# Patient Record
Sex: MALE | Race: WHITE
[De-identification: names, ages, dates, MRNs, and addresses within clinical notes are randomized per-mention and may not be internally consistent; named-entity substitution may affect disease eponyms.]

---

## 2018-01-05 ENCOUNTER — HOSPITAL ENCOUNTER (EMERGENCY)
Dept: HOSPITAL 80 - FED | Age: 33
Discharge: HOME | End: 2018-01-05
Payer: SELF-PAY

## 2018-01-05 VITALS
TEMPERATURE: 97.8 F | RESPIRATION RATE: 19 BRPM | OXYGEN SATURATION: 93 % | SYSTOLIC BLOOD PRESSURE: 112 MMHG | DIASTOLIC BLOOD PRESSURE: 76 MMHG | HEART RATE: 89 BPM

## 2018-01-05 DIAGNOSIS — I30.9: Primary | ICD-10-CM

## 2018-01-05 LAB — PLATELET # BLD: 441 10^3/UL (ref 150–400)
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
## 2018-01-05 NOTE — CPEKG
Heart Rate: 108

RR Interval: 556

P-R Interval: 184

QRSD Interval: 98

QT Interval: 316

QTC Interval: 424

P Axis: 76

QRS Axis: 79

T Wave Axis: 13

EKG Severity - ABNORMAL ECG -

EKG Impression: SINUS TACHYCARDIA

EKG Impression: AR depression in the inferior leads, consider pericarditis

Electronically Signed By: Humaira Childers 05-Jan-2018 15:16:12

## 2018-01-05 NOTE — GCON
[f rep st]



                                                                    CONSULTATION





The patient is a healthy gentleman who developed chest discomfort. 



He has been very well and healthy.  He has been exercising, feeling good and then, about 2-1/2 weeks 
ago he got a little bit of discomfort between his shoulder blades.  He developed, today, severe left 
chest discomfort, shortness of breath and very, very bad pain.  He feels cold. 



He has not felt hot.  He has not had fever, chills.  He has not had upper respiratory tract symptoms.
  He has had no rashes or arthralgias. 



He has had no nausea, vomiting. 



No lightheadedness, dizziness.  No fatigue.  No trauma at all. 



He is a healthy man.  Has not had heart issues. 



He has a remote history of migraines. 



Those have not been bothering him.



CARDIAC RISK FACTORS:  Negative for hypertension, diabetes mellitus, hyperlipidemia, hyperuricemia, f
amily history of premature coronary disease, smoking, or any known coronary artery disease.  He does 
not have obesity.



REVIEW OF SYSTEMS:  Negative except as noted above.  10-point review of systems.



MEDICATIONS:  None.



SURGICAL HISTORY:  Status post splenectomy.



SOCIAL HISTORY:  He was born in [place], [place], went to school here.  He is a student now of busin
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  He work
ed hard and there was a lot of labor involved, many strange hours.  He has a roommate who is helpful.
  He exercises by yoga and swimming on a regular basis.  He has been swimming for 10-15 years and lov
es it as an exercise. 



Does not smoke, does not drink significant amounts of alcohol, and does not use any other drugs.



FAMILY HISTORY:  No family history of premature coronary artery disease.  No history of unexplained s
udden death at a young age.



PHYSICAL EXAMINATION:  VITAL SIGNS:  His heart rate is 90, respiratory rate is 12, his blood pressure
 is 130/70.  HEENT:  Pupils equal and reactive.  Mucous membranes and mouth moist.  NECK:  Supple.  C
ARDIOVASCULAR:  S1, S2.  Soft systolic murmur left sternal border.  No diastolic murmur.  No S3, S4. 
 No rubs.  PULMONARY:  Rhonchi.  No rales, wheezing, or dullness.  ABDOMEN:  Soft, nontender, without
 masses.  CVA:  No tenderness.  EXTREMITIES:  No edema, inflammation or ulceration.  SKIN:  Age-relat
ed changes.  PSYCH:  No obvious anxiety or depression. 



The EKG shows sinus tachycardia with MI depression in II, III, and aVF, consistent with pericarditis.
 



Chest x-ray is unremarkable.  The patient has had dissection ruled out by a CT scan.  No pulmonary em
bolic disease.  Question of pericarditis.  Echocardiogram shows that the patient has a pericardial ef
fusion without any tamponade physiology. 



White count is 21.63, hematocrit 45, platelets are 441.  Sodium 143, potassium 4.8, chloride 105, CO2
 19, BUN 11, creatinine 0.8.  Troponin less than 0.02.  NT-BNP 34.  Glucose 93.



ASSESSMENT AND PLAN:  Chest pain. 



The patient has a clinical picture which is consistent with acute pericarditis. 



I think that that is his current problem.  There is nothing to suggest an acute coronary syndrome in 
his case.  His pulses are full and equal and the CT angiogram did not show anything consistent with a
n aortic dissection.  His D-dimer is negative.  And the CT scan shows no pulmonary embolic disease in
 terms of the differential diagnosis.  So far, there is nothing to suggest pneumonia, but that is cer
tainly a possibility in his case that may be just brewing and with hydration might show up.  Tomy recinos has a very remarkable white count.  There is nothing to suggest a pneumothorax. 



When we think of pericarditis, obviously the most common cause is idiopathic and then people can get 
infectious causes, obviously coxsackie, echovirus, Chela-Barr, HIV.  Tuberculosis, Lyme disease.  T
here are other bacterial, fungal, and parasitic infections clearly.  He does not have any uremia as a
 cause.  Connective tissue disease is possible but aside from his migraines, he does not have much fo
r that differential diagnosis.  __________ he has not had trauma or any drugs that might cause it, russell
ch as hydralazine, procainamide, isoniazid, phenytoin, penicillin, and will check his thyroid functio
ns. 



We can check his sedimentation rate for interest.  Often times, the CPK or troponin and pericarditis 
will be slightly elevated.  At this point in time, he is getting some aggressive narcotics and we wou
ld start then the nonsteroidal anti-inflammatory medicines.  Colchicine would be given to him to rule
 out and prevent recurrent pericarditis.  We would normally give it 1-2 mg for 1 day and then 0.5 mg 
a day for 3 months.  We will obviously try very hard to avoid any steroids in him. 



The patient himself wants to go home and has a good roommate who is very reliable, so that will be de
cided with the emergency room staff depending on how his pain is controlled.  I think he may need to 
be hospitalized no matter what, given how poorly he felt earlier this morning.  Obviously if he stays
 in the hospital, that is good for us because we can watch him very carefully, take care of any probl
ems that come up and make sure he is stable. 



I have discussed this case with the emergency room physician. 



Thank you very much.





Job #:  [id number]

## 2018-01-05 NOTE — ECHO
[email or web address]





[address] [address] 

Main: [phone] 



Fax: 



Transthoracic Echocardiogram 

Name:             MONA BOWMAN                          MR#:

[id number]

Study Date:       2018                             Study Time:

11:36 AM

YOB: 1985                             Age:

32 year(s)

Height:           182.9 cm (72 in.)                      Weight:

77.11 kg (170 lb.)

BSA:              1.99 m2                                Gender:

Male

Examination:      Echo                                   Indication:

Severe chest pain/back pain, pericardial



effusion by CT 

Image Quality:                                           Contrast: 

Requested by:     Humaira Childers                           BP:

116 mmHg/65 mmHg

Heart Rate:                                              Rhythm:

Tachycardia

Indication:       Severe chest pain/back pain, pericardial effusion by

CT



Procedure Staff 

Ultrasound Technician:   Dustin Rosenbaum 

Reading Physician:       Cesar Barger 

Requesting Provider: 



Conclusions:          Normal size left ventricle.  

No LV hypertrophy.  

Normal global systolic LV function.  

EF is 63 %.  

No regional wall motion abnormality.  

Normal diastolic LV function.  

Normal size right ventricle.  

The mitral valve is normal in appearance and function.  

There is no mitral valve regurgitation.  

The aortic valve is normal in appearance and function.  

Small pericardial effusion.  

No echocardiographic evidence of hemodynamic compromise.  

There is no obvious evidence of a aortic dissection by the echo view

in the ascending, or aortic arch..



Measurements: 

Chambers                    Valvular Assessment AV/MV

Valvular Assessment TV/PV



Normal                                   Normal

Normal

Name         Value     Range              Name         Value Range

Name          Value Range

Ao Maddy (MM): 2.9 cm    (2.2 cm-3.7            AV Vmax:     1.61 m/s (1

m/s-1.7       PV Vmax:      1.21 m/s (0.6 m/s-0.9



cm)                                  m/s)

m/s)

IVSd (2D):   0.9 cm (0.6 cm-1.1               AV maxPG:    10 mmHg ( -

)         PV PGmax:     6  mmHg ( - )



cm)                LVOT Vmax:   1.35 m/s (0.7 m/s-1.1 

LVDd (2D):   5.1 cm    (4.2 cm-5.9                              m/s)  



cm)                MV E Vmax:   0.76 m/s ( - )  

LVDs (2D):   3.4 cm    (2.1 cm-4              MV A Vmax:   0.37 m/s (

- )



cm)                MV E/A:      2.05 ( - )  

LVPWd (2D):  0.9 cm    (0.6 cm-1 



cm)   

LVEF (2D):   63        (>=54 %)   



Patient: MONA BOWMAN                        MRN: [id number]

Study Date: 2018   Page 1 of 2

11:36 AM 









Continued Measurements: 

Chambers                     Valvular Assessment AV/MV  



Name                      Value           Name

Value

LADs Lon.2 cm               MV E/E' Septal:

6.40

LA Area:                 19.3 cm2         MV E/E' Lateral:

5.60



Findings:             Left Ventricle: 

Normal size left ventricle. No LV hypertrophy. Normal global systolic

LV function. EF is 63 %. No

regional wall motion abnormality. Normal diastolic LV function.  

Right Ventricle: 

Normal size right ventricle.  

Left Atrium: 

The left atrium is normal in size.  

Right Atrium: 

The right atrium is normal in size.  

Mitral Valve: 

The mitral valve is normal in appearance and function. There is no

mitral valve regurgitation.

Aortic Valve: 

The aortic valve is tri-leaflet. The aortic valve is normal in

appearance and function.

Tricuspid Valve: 

The tricuspid valve is normal in appearance and function.  

Pulmonic Valve: 

The pulmonic valve is normal in appearance and function.  

Aorta: 

The aorta is normal. No dilatation of the aorta.  

Pericardium: 

Small pericardial effusion. No echocardiographic evidence of

hemodynamic compromise. There is

no obvious evidence of a aortic dissection by the echo view in the

ascending, or aortic arch..







Electronically signed by Cesar Barger on 2018 at 12:19 PM 

(No Signature Object) 



Patient: MONA BOWMAN                        MRN: [id number]

Study Date: 2018   Page 2 of 2

11:36 AM 







[id number]